# Patient Record
Sex: FEMALE | Race: WHITE | NOT HISPANIC OR LATINO | Employment: OTHER | URBAN - METROPOLITAN AREA
[De-identification: names, ages, dates, MRNs, and addresses within clinical notes are randomized per-mention and may not be internally consistent; named-entity substitution may affect disease eponyms.]

---

## 2019-02-01 ENCOUNTER — TELEPHONE (OUTPATIENT)
Dept: OBSTETRICS AND GYNECOLOGY | Facility: CLINIC | Age: 79
End: 2019-02-01

## 2019-02-01 ENCOUNTER — OFFICE VISIT (OUTPATIENT)
Dept: INTERNAL MEDICINE | Facility: CLINIC | Age: 79
End: 2019-02-01
Payer: MEDICARE

## 2019-02-01 ENCOUNTER — PATIENT OUTREACH (OUTPATIENT)
Dept: ADMINISTRATIVE | Facility: HOSPITAL | Age: 79
End: 2019-02-01

## 2019-02-01 VITALS
OXYGEN SATURATION: 95 % | BODY MASS INDEX: 40.57 KG/M2 | HEART RATE: 94 BPM | TEMPERATURE: 98 F | WEIGHT: 220.44 LBS | HEIGHT: 62 IN | SYSTOLIC BLOOD PRESSURE: 134 MMHG | DIASTOLIC BLOOD PRESSURE: 79 MMHG

## 2019-02-01 DIAGNOSIS — Z00.00 ENCOUNTER FOR MEDICAL EXAMINATION TO ESTABLISH CARE: Primary | ICD-10-CM

## 2019-02-01 DIAGNOSIS — Z72.0 TOBACCO USE: ICD-10-CM

## 2019-02-01 DIAGNOSIS — Z11.59 NEED FOR HEPATITIS C SCREENING TEST: ICD-10-CM

## 2019-02-01 DIAGNOSIS — F32.A ANXIETY AND DEPRESSION: ICD-10-CM

## 2019-02-01 DIAGNOSIS — Z13.220 SCREENING FOR CHOLESTEROL LEVEL: ICD-10-CM

## 2019-02-01 DIAGNOSIS — Z11.4 SCREENING FOR HIV (HUMAN IMMUNODEFICIENCY VIRUS): ICD-10-CM

## 2019-02-01 DIAGNOSIS — L40.9 PSORIASIS: ICD-10-CM

## 2019-02-01 DIAGNOSIS — N76.5 VAGINAL ULCERATION: ICD-10-CM

## 2019-02-01 DIAGNOSIS — E66.01 SEVERE OBESITY (BMI >= 40): ICD-10-CM

## 2019-02-01 DIAGNOSIS — E66.01 MORBID (SEVERE) OBESITY DUE TO EXCESS CALORIES: ICD-10-CM

## 2019-02-01 DIAGNOSIS — N89.8 VAGINAL PRURITUS: ICD-10-CM

## 2019-02-01 DIAGNOSIS — F41.9 ANXIETY AND DEPRESSION: ICD-10-CM

## 2019-02-01 PROCEDURE — 99999 PR PBB SHADOW E&M-NEW PATIENT-LVL IV: ICD-10-PCS | Mod: PBBFAC,,, | Performed by: FAMILY MEDICINE

## 2019-02-01 PROCEDURE — 99204 OFFICE O/P NEW MOD 45 MIN: CPT | Mod: PBBFAC,PO | Performed by: FAMILY MEDICINE

## 2019-02-01 PROCEDURE — 99999 PR PBB SHADOW E&M-NEW PATIENT-LVL IV: CPT | Mod: PBBFAC,,, | Performed by: FAMILY MEDICINE

## 2019-02-01 PROCEDURE — 99204 OFFICE O/P NEW MOD 45 MIN: CPT | Mod: S$PBB,,, | Performed by: FAMILY MEDICINE

## 2019-02-01 PROCEDURE — 87510 GARDNER VAG DNA DIR PROBE: CPT

## 2019-02-01 PROCEDURE — 99204 PR OFFICE/OUTPT VISIT, NEW, LEVL IV, 45-59 MIN: ICD-10-PCS | Mod: S$PBB,,, | Performed by: FAMILY MEDICINE

## 2019-02-01 PROCEDURE — 87480 CANDIDA DNA DIR PROBE: CPT

## 2019-02-01 RX ORDER — LANOLIN ALCOHOL/MO/W.PET/CERES
5000 CREAM (GRAM) TOPICAL DAILY
COMMUNITY

## 2019-02-01 RX ORDER — MELATONIN 10 MG
CAPSULE ORAL
COMMUNITY

## 2019-02-01 RX ORDER — MULTIVITAMIN
1 TABLET ORAL DAILY
COMMUNITY

## 2019-02-01 RX ORDER — DIAPER,BRIEF,ADULT, DISPOSABLE
EACH MISCELLANEOUS
COMMUNITY

## 2019-02-01 RX ORDER — CLOTRIMAZOLE 1 %
CREAM (GRAM) TOPICAL 2 TIMES DAILY
COMMUNITY

## 2019-02-01 RX ORDER — LORATADINE 10 MG/1
10 TABLET ORAL DAILY
COMMUNITY

## 2019-02-01 RX ORDER — GLUCOSAMINE/CHONDRO SU A 500-400 MG
1 TABLET ORAL 3 TIMES DAILY
COMMUNITY

## 2019-02-01 RX ORDER — DIPHENHYDRAMINE HCL 25 MG
TABLET ORAL
COMMUNITY

## 2019-02-01 RX ORDER — ACETAMINOPHEN 500 MG
500 TABLET ORAL EVERY 6 HOURS PRN
COMMUNITY

## 2019-02-01 NOTE — PROGRESS NOTES
Robin Arreguin MD  Patient Care Team:  Robin Arreguin MD as PCP - General (Family Medicine)  Nakia Mcneil LPN as Care Coordinator    Has the patient seen any provider outside of the network since the last visit ? (yes). If yes, HIPPA forms completed and records requested.      Visit Type:[facility] care    Chief Complaint:  Chief Complaint   Patient presents with    [facility] Care       History of Present Illness:      PMH anxiety and depression  PSH  x2  FMH sibling brother stomach and sister breast cancer  SH tobacco, 100-150 pk years    Has been seen by [facility] for hands in past  Undiagnosed there  Leaking and sores were present at that time  Bleeding in past  Pruritis   Uses fungal and vaginal creams, antihistamine w/ improvement  [place] in bleach  Thinks started from vagina and spreading to anus and hands    How often do you have a drink containing Alcohol? denied     Do you exercise  (no ) not active    Do you take a baby Aspirin daily ( no)    Do you have an advance directive ( no ) The patient was given information regarding Living Will/Durable Power-of- if requested.     The following were reviewed: Active problem list, medication list, allergies, family history, social history, and Health Maintenance.     Medications have been reviewed and reconciled with patient at visit today.    Exam:  Vitals:    19 1023   BP: 134/79   Pulse: 94   Temp: 97.8 °F (36.6 °C)     Weight: 100 kg (220 lb 7.4 oz)   Body mass index is 40.32 kg/m².    ROS  Pt has completed a full 14 system review. All items are negative except as indicated.  All pertinent positives and negatives as in HPI  Vaginal sores  Physical Exam   Constitutional: She is oriented to person, place, and time. She appears well-nourished.  Non-toxic appearance. No distress.   Poor hygiene   HENT:   Head: Normocephalic and atraumatic.   Mouth/Throat: Oropharynx is clear and moist.   Eyes: Conjunctivae and EOM are normal. No scleral  icterus.   Neck: Normal range of motion. Neck supple.   Cardiovascular: Normal rate, regular rhythm and normal heart sounds.   Pulmonary/Chest: Effort normal and breath sounds normal. She has no wheezes.   Abdominal: Soft. Bowel sounds are normal. There is no tenderness.   Diastasis recti   Genitourinary: There is rash, tenderness and lesion on the right labia. There is rash, tenderness and lesion on the left labia.   Genitourinary Comments: Chaperone present  Diffusely excoriated, erythematous, edematous, ulcerated, weeping/bleeding external genitalia on mons, labia majora   Musculoskeletal: She exhibits edema (b/l hands). She exhibits no deformity.   Neurological: She is alert and oriented to person, place, and time.   Skin: Skin is warm and dry.   Psychiatric: She has a normal mood and affect. Her speech is normal and behavior is normal. Judgment and thought content normal. Her mood appears not anxious. Her affect is not inappropriate. Cognition and memory are normal. She does not exhibit a depressed mood.   Vitals reviewed.      Laboratory Reviewed: (N/A)  No results found for: WBC, HGB, HCT, PLT, CHOL, TRIG, HDL, LDLDIRECT, ALT, AST, NA, K, CL, CREATININE, BUN, CO2, TSH, PSA, INR, GLUF, HGBA1C, MICROALBUR    Assessment:  1. Encounter for medical examination to establish care    2. Tobacco use    3. Severe obesity (BMI >= 40)    4. Psoriasis    5. Vaginal pruritus    6. Vaginal ulceration    7. Screening for HIV (human immunodeficiency virus)    8. Screening for cholesterol level    9. Anxiety and depression    10. Need for hepatitis C screening test    11. Morbid (severe) obesity due to excess calories           Plan:  Problem List Items Addressed This Visit        Psychiatric    Anxiety and depression    Current Assessment & Plan     Offered referral to psych. Pt refuses.            Derm    Psoriasis    Relevant Orders    Ambulatory referral to Obstetrics / Gynecology    Vaginosis Screen by DNA Probe     Urinalysis    Urine culture    C. trachomatis/N. gonorrhoeae by AMP DNA    HSV by Rapid PCR, Non-Blood [facility]; Vagina       Endocrine    Severe obesity (BMI >= 40)    Relevant Orders    CBC auto differential    Comprehensive metabolic panel       Other    Tobacco use    Relevant Orders    CBC Without Differential    CBC auto differential    Comprehensive metabolic panel      Other Visit Diagnoses     Encounter for medical examination to establish care    -  Primary    Vaginal pruritus        Relevant Orders    Ambulatory referral to Obstetrics / Gynecology    Vaginosis Screen by DNA Probe    Urinalysis    Urine culture    C. trachomatis/N. gonorrhoeae by AMP DNA    HSV by Rapid PCR, Non-Blood [facility]; Vagina    RPR    HERPES SIMPLEX 1 & 2 IGM    Vaginal ulceration        Relevant Orders    Ambulatory referral to Obstetrics / Gynecology    Vaginosis Screen by DNA Probe    Urinalysis    Urine culture    C. trachomatis/N. gonorrhoeae by AMP DNA    HSV by Rapid PCR, Non-Blood [facility]; Vagina    RPR    HERPES SIMPLEX 1 & 2 IGM    Quantiferon Gold TB    Screening for HIV (human immunodeficiency virus)        Relevant Orders    HIV 1/2 Ag/Ab (4th Gen)    Screening for cholesterol level        Relevant Orders    Lipid panel    Need for hepatitis C screening test        Relevant Orders    Hepatitis C antibody    Morbid (severe) obesity due to excess calories         Relevant Orders    Lipid panel      PE  ddx behcets disease, sti, vaginal atrophy, cancer, yeast infection, scabies, TB, inverse psoriasis. Self treating w/ benadryl pills in vagina, athlete's foot cream and vagisil. Refer to gyn for possible etiology/bx    Follow up: Follow-up in about 2 weeks (around 2/15/2019).    After visit summary printed and given to patient upon discharge.

## 2019-02-01 NOTE — TELEPHONE ENCOUNTER
Attempted to contact patient to offer assistance with scheduling an appointment from a referral received, no answer and unable to LVM.  Should patient return call, the authorized referral is in the system to assist patient with scheduling the appointment.

## 2019-02-01 NOTE — LETTER
February 1, 2019        Autumn Lugo  [address] [place] [address]      Dear Ms. Lugo,    You have an upcoming appointment with Robin Arreguin MD on 02/15/19.      Your chart is indicating you may be due for the following and I will be happy to assist you in scheduling any needed appointments:  Health Maintenance Due   Topic    Lipid Panel     TETANUS VACCINE     DEXA SCAN     Zoster Vaccine     Pneumococcal Vaccine (65+ Low/Medium Risk) (1 of 2 - PCV13)    Influenza Vaccine           If you have had any of the above done at another facility, please bring the records or information with you so that your record at [facility] will be complete.    We will be happy to assist you with scheduling any necessary appointments or you may contact the [facility] appointment desk at [phone] to schedule at your convenience.     Thank you for choosing [facility] for your healthcare needs,      Nakia LEW LPN Care Coordinator  [facility]  [phone]

## 2019-02-02 LAB
CANDIDA RRNA VAG QL PROBE: NEGATIVE
G VAGINALIS RRNA GENITAL QL PROBE: NEGATIVE
T VAGINALIS RRNA GENITAL QL PROBE: NEGATIVE

## 2020-07-15 DIAGNOSIS — Z71.89 COMPLEX CARE COORDINATION: ICD-10-CM
